# Patient Record
Sex: MALE | NOT HISPANIC OR LATINO | URBAN - METROPOLITAN AREA
[De-identification: names, ages, dates, MRNs, and addresses within clinical notes are randomized per-mention and may not be internally consistent; named-entity substitution may affect disease eponyms.]

---

## 2021-11-23 ENCOUNTER — EMERGENCY (EMERGENCY)
Facility: HOSPITAL | Age: 26
LOS: 1 days | Discharge: HOME | End: 2021-11-23
Admitting: STUDENT IN AN ORGANIZED HEALTH CARE EDUCATION/TRAINING PROGRAM
Payer: SELF-PAY

## 2021-11-23 VITALS
TEMPERATURE: 98 F | HEART RATE: 68 BPM | HEIGHT: 72 IN | SYSTOLIC BLOOD PRESSURE: 117 MMHG | RESPIRATION RATE: 18 BRPM | WEIGHT: 154.98 LBS | DIASTOLIC BLOOD PRESSURE: 69 MMHG | OXYGEN SATURATION: 98 %

## 2021-11-23 DIAGNOSIS — Z77.21 CONTACT WITH AND (SUSPECTED) EXPOSURE TO POTENTIALLY HAZARDOUS BODY FLUIDS: ICD-10-CM

## 2021-11-23 LAB
ALBUMIN SERPL ELPH-MCNC: 5 G/DL — SIGNIFICANT CHANGE UP (ref 3.3–5)
ALP SERPL-CCNC: 51 U/L — SIGNIFICANT CHANGE UP (ref 40–120)
ALT FLD-CCNC: 10 U/L — SIGNIFICANT CHANGE UP (ref 10–45)
ANION GAP SERPL CALC-SCNC: 8 MMOL/L — SIGNIFICANT CHANGE UP (ref 5–17)
AST SERPL-CCNC: 16 U/L — SIGNIFICANT CHANGE UP (ref 10–40)
BASOPHILS # BLD AUTO: 0.03 K/UL — SIGNIFICANT CHANGE UP (ref 0–0.2)
BASOPHILS NFR BLD AUTO: 0.4 % — SIGNIFICANT CHANGE UP (ref 0–2)
BILIRUB SERPL-MCNC: 0.5 MG/DL — SIGNIFICANT CHANGE UP (ref 0.2–1.2)
BUN SERPL-MCNC: 13 MG/DL — SIGNIFICANT CHANGE UP (ref 7–23)
CALCIUM SERPL-MCNC: 9.5 MG/DL — SIGNIFICANT CHANGE UP (ref 8.4–10.5)
CHLORIDE SERPL-SCNC: 105 MMOL/L — SIGNIFICANT CHANGE UP (ref 96–108)
CO2 SERPL-SCNC: 27 MMOL/L — SIGNIFICANT CHANGE UP (ref 22–31)
CREAT SERPL-MCNC: 0.81 MG/DL — SIGNIFICANT CHANGE UP (ref 0.5–1.3)
EOSINOPHIL # BLD AUTO: 0.03 K/UL — SIGNIFICANT CHANGE UP (ref 0–0.5)
EOSINOPHIL NFR BLD AUTO: 0.4 % — SIGNIFICANT CHANGE UP (ref 0–6)
GLUCOSE SERPL-MCNC: 111 MG/DL — HIGH (ref 70–99)
HCT VFR BLD CALC: 40.4 % — SIGNIFICANT CHANGE UP (ref 39–50)
HGB BLD-MCNC: 14.2 G/DL — SIGNIFICANT CHANGE UP (ref 13–17)
HIV 1+2 AB+HIV1 P24 AG SERPL QL IA: SIGNIFICANT CHANGE UP
IMM GRANULOCYTES NFR BLD AUTO: 0.1 % — SIGNIFICANT CHANGE UP (ref 0–1.5)
LYMPHOCYTES # BLD AUTO: 2.94 K/UL — SIGNIFICANT CHANGE UP (ref 1–3.3)
LYMPHOCYTES # BLD AUTO: 37 % — SIGNIFICANT CHANGE UP (ref 13–44)
MCHC RBC-ENTMCNC: 31.1 PG — SIGNIFICANT CHANGE UP (ref 27–34)
MCHC RBC-ENTMCNC: 35.1 GM/DL — SIGNIFICANT CHANGE UP (ref 32–36)
MCV RBC AUTO: 88.6 FL — SIGNIFICANT CHANGE UP (ref 80–100)
MONOCYTES # BLD AUTO: 0.53 K/UL — SIGNIFICANT CHANGE UP (ref 0–0.9)
MONOCYTES NFR BLD AUTO: 6.7 % — SIGNIFICANT CHANGE UP (ref 2–14)
NEUTROPHILS # BLD AUTO: 4.41 K/UL — SIGNIFICANT CHANGE UP (ref 1.8–7.4)
NEUTROPHILS NFR BLD AUTO: 55.4 % — SIGNIFICANT CHANGE UP (ref 43–77)
NRBC # BLD: 0 /100 WBCS — SIGNIFICANT CHANGE UP (ref 0–0)
PLATELET # BLD AUTO: 218 K/UL — SIGNIFICANT CHANGE UP (ref 150–400)
POTASSIUM SERPL-MCNC: 4 MMOL/L — SIGNIFICANT CHANGE UP (ref 3.5–5.3)
POTASSIUM SERPL-SCNC: 4 MMOL/L — SIGNIFICANT CHANGE UP (ref 3.5–5.3)
PROT SERPL-MCNC: 7.8 G/DL — SIGNIFICANT CHANGE UP (ref 6–8.3)
RBC # BLD: 4.56 M/UL — SIGNIFICANT CHANGE UP (ref 4.2–5.8)
RBC # FLD: 12.9 % — SIGNIFICANT CHANGE UP (ref 10.3–14.5)
SODIUM SERPL-SCNC: 140 MMOL/L — SIGNIFICANT CHANGE UP (ref 135–145)
WBC # BLD: 7.95 K/UL — SIGNIFICANT CHANGE UP (ref 3.8–10.5)
WBC # FLD AUTO: 7.95 K/UL — SIGNIFICANT CHANGE UP (ref 3.8–10.5)

## 2021-11-23 PROCEDURE — 80053 COMPREHEN METABOLIC PANEL: CPT

## 2021-11-23 PROCEDURE — 36415 COLL VENOUS BLD VENIPUNCTURE: CPT

## 2021-11-23 PROCEDURE — 99283 EMERGENCY DEPT VISIT LOW MDM: CPT

## 2021-11-23 PROCEDURE — 85025 COMPLETE CBC W/AUTO DIFF WBC: CPT

## 2021-11-23 PROCEDURE — 99284 EMERGENCY DEPT VISIT MOD MDM: CPT

## 2021-11-23 PROCEDURE — 87389 HIV-1 AG W/HIV-1&-2 AB AG IA: CPT

## 2021-11-23 RX ORDER — EMTRICITABINE AND TENOFOVIR DISOPROXIL FUMARATE 200; 300 MG/1; MG/1
1 TABLET, FILM COATED ORAL
Qty: 21 | Refills: 0
Start: 2021-11-23 | End: 2021-12-13

## 2021-11-23 RX ORDER — EMTRICITABINE AND TENOFOVIR DISOPROXIL FUMARATE 200; 300 MG/1; MG/1
1 TABLET, FILM COATED ORAL ONCE
Refills: 0 | Status: COMPLETED | OUTPATIENT
Start: 2021-11-23 | End: 2021-11-23

## 2021-11-23 RX ORDER — RALTEGRAVIR 400 MG/1
400 TABLET, FILM COATED ORAL ONCE
Refills: 0 | Status: COMPLETED | OUTPATIENT
Start: 2021-11-23 | End: 2021-11-23

## 2021-11-23 RX ORDER — RALTEGRAVIR 400 MG/1
1 TABLET, FILM COATED ORAL
Qty: 42 | Refills: 0
Start: 2021-11-23 | End: 2021-12-13

## 2021-11-23 RX ADMIN — RALTEGRAVIR 400 MILLIGRAM(S): 400 TABLET, FILM COATED ORAL at 15:26

## 2021-11-23 RX ADMIN — EMTRICITABINE AND TENOFOVIR DISOPROXIL FUMARATE 1 TABLET(S): 200; 300 TABLET, FILM COATED ORAL at 15:26

## 2021-11-23 NOTE — ED PROVIDER NOTE - CLINICAL SUMMARY MEDICAL DECISION MAKING FREE TEXT BOX
25 y/o m presents after exposure to his sexual partner's body fluid, concerned for possible HIV exposure and wanting to start PEP.  VSS in ED, will send baseline labs, hiv test, start PEP, given prescription and will f/u with pmd for continued bloodwork throughout the course of PEP, recommend repeat hiv test in 3 months.

## 2021-11-23 NOTE — ED PROVIDER NOTE - PATIENT PORTAL LINK FT
You can access the FollowMyHealth Patient Portal offered by [facility] by registering at the following website: [email or web address]. By joining [employer]’s FollowMyHealth portal, you will also be able to view your health information using other applications (apps) compatible with our system.

## 2021-11-23 NOTE — ED ADULT NURSE NOTE - OBJECTIVE STATEMENT
26y male requesting HIV testing after having unprotected sex 2 days ago. pt states, "I don't know if the person has HIV but I don't know them well so I just want to be cautious." denies any symptoms. denies PMH. No acute distress noted at this time. blood work sent to lab.

## 2021-11-23 NOTE — ED PROVIDER NOTE - NSFOLLOWUPINSTRUCTIONS_ED_ALL_ED_FT
Postexposure Prophylaxis    WHAT YOU NEED TO KNOW:    Postexposure prophylaxis (PEP) is medical care given to prevent HIV, hepatitis B, and other diseases. PEP may include first aid, testing, and medicines. Exposure can occur when you have contact with certain body fluids from another person. These fluids include blood, semen, and vaginal fluid. They also include any other body fluid that may have blood in it, such as saliva or urine. You are exposed if these fluids touch an open area of your skin, such as a cut. You also are exposed if the fluids touch a mucus membrane. This is a moist area, such as in the eyes, nose, and mouth. You can be exposed by a needlestick through the skin.     DISCHARGE INSTRUCTIONS:    Medicines:   •Antiretrovirals: These medicines help prevent HIV. They must be taken for 28 days, unless your healthcare provider tells you otherwise. You may be given a starter pack with enough medicine for 1 to 7 days. You may be given medicine for the full 28 days instead. If you receive a starter pack, you must return to your healthcare provider in 1 to 7 days. At this visit, you will receive the rest of the medicine.      •Hepatitis B vaccine: This medicine helps prevent hepatitis B. You will need 3 doses (shots) of the vaccine. The second dose should be taken 1 to 2 months after the first dose. The third dose should be taken 4 to 6 months after the first dose.      •Antibiotics: These are germ-killing medicines to help treat or prevent sexually transmitted diseases, such as gonorrhea.      •Take your medicine as directed. Contact your healthcare provider if you think your medicine is not helping or if you have side effects. Tell him or her if you are allergic to any medicine. Keep a list of the medicines, vitamins, and herbs you take. Include the amounts, and when and why you take them. Bring the list or the pill bottles to follow-up visits. Carry your medicine list with you in case of an emergency.      Follow up with your healthcare provider as directed: If you did not receive any treatment after the exposure, you will need to follow up with your healthcare provider within 1 week. If you were given antiretrovirals, you will need a follow-up visit in about 2 weeks. Further HIV testing will be needed 6 weeks, 3 months, and 6 months after the exposure. You may have HIV testing up to 12 months after the exposure.    Precautions: In case you are infected, you will need to take steps to keep others from getting sick. These steps can help you avoid being exposed again:   •Avoid sex, or have safe sex. Safe sex means having sex only with one person who is not infected and who is only having sex with you. It also means using condoms each time you have sex.       •Avoid injection drug use. If you cannot do this, always use needles that are sterile (germ-free).       •Follow all safety rules at your workplace if you are at risk of exposure. Be sure to use safety equipment as needed.       •Get the hepatitis B vaccine if you have not already.       •Do not breastfeed unless you know you are not infected.       In case of future exposure: If you think you have been exposed, get first aid right away. If you are at work, follow work policy to report the exposure. The type of first aid you need depends on what part of your body was exposed:   •Open skin: Wash the area right away with soap and water. Use a gel hand  if you do not have soap or running water. Do not use anything harsh, such as bleach. Do not squeeze or rub the skin.       •Eyes: Rinse your eyes with water or saline (a salt solution) right away. Be sure to clean well by moving your eyelids with your fingers as you rinse. Keep contact lenses in while rinsing your eyes, then remove and clean them as usual. Avoid soap or other .       •Mouth: Spit out the blood or body fluid right away. Rinse your mouth with water or saline several times. Avoid putting soap or other  in your mouth.       For support and more information: It can be hard to cope if you think you have been exposed to a disease. You may feel scared and concerned about your health. This is normal. It can be helpful to find out all you can about the risk of exposure. Counseling or joining a support group also can help. Talk to your healthcare provider, or contact the following:   •National Center for HIV/AIDS, Viral Hepatitis, STD, and TB Prevention, CDC  Web Address: www.cdc.gov/nchhstp/      •The National Clinicians’ Post-Exposure Prophylaxis Hotline  Web Address: www.ncc.[facility].edu/about_ncc/pepline/        Contact your healthcare provider if:   •You have nausea or diarrhea.      •You are more tired than usual.       •You have new headaches, or you feel dizzy.       •You have trouble sleeping.       •Your eyes or skin turn yellow.       •You are not eating because of appetite loss.       •You are or may be pregnant.       Return to the emergency department if:   •You have a fever.       •You have a rash.       •You have new muscle pain or pain in your back or abdomen.       •You urinate more often than usual, have blood in your urine, or have pain while urinating.       •You are more thirsty than usual.       •You have trouble swallowing or breathing.

## 2021-11-23 NOTE — ED PROVIDER NOTE - OBJECTIVE STATEMENT
25 y/o m presents stating 2 days ago he had a possible exposure to a sexual partner's semen and is concerned for possible HIV exposure.  Pt stating he would like to be tested for HIV and would also like to start PEP.  Pt with no other complaints, no current sx.

## 2021-11-23 NOTE — ED ADULT TRIAGE NOTE - CHIEF COMPLAINT QUOTE
Pt presents to ED requesting HIV testing. Reports intercourse 2 days ago, partner HIV status unknown. Does not endorse any complaints at this time.

## 2021-12-03 RX ORDER — EMTRICITABINE AND TENOFOVIR DISOPROXIL FUMARATE 200; 300 MG/1; MG/1
1 TABLET, FILM COATED ORAL
Qty: 21 | Refills: 0
Start: 2021-12-03 | End: 2021-12-23

## 2021-12-03 RX ORDER — RALTEGRAVIR 400 MG/1
1 TABLET, FILM COATED ORAL
Qty: 42 | Refills: 0
Start: 2021-12-03 | End: 2021-12-23

## 2021-12-04 ENCOUNTER — EMERGENCY (EMERGENCY)
Facility: HOSPITAL | Age: 26
LOS: 1 days | Discharge: HOME | End: 2021-12-04
Admitting: EMERGENCY MEDICINE
Payer: MEDICAID

## 2021-12-04 VITALS
HEART RATE: 89 BPM | OXYGEN SATURATION: 96 % | TEMPERATURE: 98 F | SYSTOLIC BLOOD PRESSURE: 126 MMHG | DIASTOLIC BLOOD PRESSURE: 73 MMHG | RESPIRATION RATE: 18 BRPM | HEIGHT: 72 IN

## 2021-12-04 DIAGNOSIS — Z20.2 CONTACT WITH AND (SUSPECTED) EXPOSURE TO INFECTIONS WITH A PREDOMINANTLY SEXUAL MODE OF TRANSMISSION: ICD-10-CM

## 2021-12-04 PROCEDURE — 99283 EMERGENCY DEPT VISIT LOW MDM: CPT

## 2021-12-04 NOTE — ED ADULT TRIAGE NOTE - CHIEF COMPLAINT QUOTE
Pt presents to ED requesting PEP kit for post exposure to HIV. Pt states, " I took a weeks worth of medication prescribed to me and [place] refilling my script, the pharmacist told me to come back to the ER". Pt denies symptoms at this time.

## 2021-12-04 NOTE — ED ADULT NURSE NOTE - NS ED NOTE ABUSE RESPONSE YN
Shift assessment completed, see flowsheets. Medications administered, see MAR. Fall precautions in place. Call light and bedside table within reach. Pt denies further needs at this time. Will continue to monitor.   Bryan Naqvi RN Yes

## 2021-12-04 NOTE — ED PROVIDER NOTE - OBJECTIVE STATEMENT
27 yo m with no pmh here requesting PEP. Pt had a possible exposure on 11/21 and was seen in the ED on 11/23. Pt given 7 day PEP kit at that time and prescription. Pt was unable to fill the prescription due to out of state insurance. Pt states he is going back to [place] in 4 days and just needs enough so he can fill it there. Offers no complaints.

## 2021-12-04 NOTE — ED ADULT NURSE NOTE - NSFALLRSKUNASSIST_ED_ALL_ED
with surgery scheduler to discuss pricing and scheduling.     Patient was seen in the room with my nurse at all times.   Portions of this note were dictated on the [facility] [employer] system and certain variabilities may be present due to voice-recognition.     Updated 8/27/2018 no

## 2021-12-04 NOTE — ED PROVIDER NOTE - PATIENT PORTAL LINK FT
You can access the FollowMyHealth Patient Portal offered by [facility] by registering at the following website: [email or web address]. By joining [employer]’s FollowMyHealth portal, you will also be able to view your health information using other applications (apps) compatible with our system.

## 2021-12-04 NOTE — ED PROVIDER NOTE - CLINICAL SUMMARY MEDICAL DECISION MAKING FREE TEXT BOX
27 yo m with no pmh here requesting PEP. Pt had a possible exposure on 11/21 and was seen in the ED on 11/23. Pt given 7 day PEP kit at that time and prescription. Pt was unable to fill the prescription due to out of state insurance. Pt states he is going back to [place] in 4 days and just needs enough so he can fill it there. Offers no complaints. Pt given another 7 day PEP kit.

## 2021-12-04 NOTE — ED ADULT NURSE NOTE - OBJECTIVE STATEMENT
26y male presents to ED for PEP medication refill. Pt states he had exposure one week ago and was given 7 day pack but unable to fill rest of meds at pharmacy due to out of state insurance issues. Denies medical complaints or adverse reaction to medication at this time. A&Ox4.

## 2025-01-22 NOTE — ED PROVIDER NOTE - PHYSICAL EXAMINATION
[No falls in past year] : Patient reported no falls in the past year [0] : 2) Feeling down, depressed, or hopeless: Not at all (0) [PHQ-2 Negative - No further assessment needed] : PHQ-2 Negative - No further assessment needed [Never] : Never [[id number]] : 0 CONSTITUTIONAL: Well-appearing;  in no apparent distress.   HEAD: Normocephalic; atraumatic.   EYES: PERRL; EOM intact; conjunctiva and sclera clear  ENT: normal nose; no rhinorrhea; normal pharynx with no erythema or lesions.   NECK: Supple; non-tender;   CARDIOVASCULAR: rrr, no audible murmurs   RESPIRATORY: Breathing easily;   MSK: FROM at all extremities, normal tone   EXT: No cyanosis or edema; N/V intact  SKIN: Normal for age and race; warm; dry; good turgor; no apparent lesions or rash.